# Patient Record
Sex: MALE | Race: WHITE | NOT HISPANIC OR LATINO | Employment: OTHER | URBAN - METROPOLITAN AREA
[De-identification: names, ages, dates, MRNs, and addresses within clinical notes are randomized per-mention and may not be internally consistent; named-entity substitution may affect disease eponyms.]

---

## 2019-10-31 ENCOUNTER — HOSPITAL ENCOUNTER (OUTPATIENT)
Facility: HOSPITAL | Age: 49
Setting detail: OBSERVATION
Discharge: LEFT BEFORE DISCHARGE | End: 2019-11-01
Attending: EMERGENCY MEDICINE | Admitting: INTERNAL MEDICINE

## 2019-10-31 ENCOUNTER — APPOINTMENT (OUTPATIENT)
Dept: CT IMAGING | Facility: HOSPITAL | Age: 49
End: 2019-10-31

## 2019-10-31 ENCOUNTER — APPOINTMENT (OUTPATIENT)
Dept: GENERAL RADIOLOGY | Facility: HOSPITAL | Age: 49
End: 2019-10-31

## 2019-10-31 DIAGNOSIS — Z45.02 AICD DISCHARGE: Primary | ICD-10-CM

## 2019-10-31 DIAGNOSIS — R42 LIGHTHEADED: ICD-10-CM

## 2019-10-31 DIAGNOSIS — R06.00 DYSPNEA, UNSPECIFIED TYPE: ICD-10-CM

## 2019-10-31 PROBLEM — I42.8 NICM (NONISCHEMIC CARDIOMYOPATHY) (HCC): Chronic | Status: ACTIVE | Noted: 2019-10-31

## 2019-10-31 LAB
ALBUMIN SERPL-MCNC: 4.3 G/DL (ref 3.5–5.2)
ALBUMIN/GLOB SERPL: 1.5 G/DL
ALP SERPL-CCNC: 74 U/L (ref 39–117)
ALT SERPL W P-5'-P-CCNC: 24 U/L (ref 1–41)
ANION GAP SERPL CALCULATED.3IONS-SCNC: 11 MMOL/L (ref 5–15)
AST SERPL-CCNC: 20 U/L (ref 1–40)
BASOPHILS # BLD AUTO: 0.2 10*3/MM3 (ref 0–0.2)
BASOPHILS NFR BLD AUTO: 1.2 % (ref 0–1.5)
BILIRUB SERPL-MCNC: 0.2 MG/DL (ref 0.2–1.2)
BUN BLD-MCNC: 4 MG/DL (ref 6–20)
BUN/CREAT SERPL: 4.8 (ref 7–25)
CALCIUM SPEC-SCNC: 9.1 MG/DL (ref 8.6–10.5)
CHLORIDE SERPL-SCNC: 98 MMOL/L (ref 98–107)
CK SERPL-CCNC: 196 U/L (ref 20–200)
CO2 SERPL-SCNC: 28 MMOL/L (ref 22–29)
CREAT BLD-MCNC: 0.83 MG/DL (ref 0.76–1.27)
DEPRECATED RDW RBC AUTO: 47.7 FL (ref 37–54)
EOSINOPHIL # BLD AUTO: 0.2 10*3/MM3 (ref 0–0.4)
EOSINOPHIL NFR BLD AUTO: 1.8 % (ref 0.3–6.2)
ERYTHROCYTE [DISTWIDTH] IN BLOOD BY AUTOMATED COUNT: 14.1 % (ref 12.3–15.4)
GFR SERPL CREATININE-BSD FRML MDRD: 98 ML/MIN/1.73
GLOBULIN UR ELPH-MCNC: 2.9 GM/DL
GLUCOSE BLD-MCNC: 128 MG/DL (ref 65–99)
HCT VFR BLD AUTO: 51.3 % (ref 37.5–51)
HGB BLD-MCNC: 17.4 G/DL (ref 13–17.7)
LYMPHOCYTES # BLD AUTO: 4.1 10*3/MM3 (ref 0.7–3.1)
LYMPHOCYTES NFR BLD AUTO: 30.4 % (ref 19.6–45.3)
MAGNESIUM SERPL-MCNC: 1.9 MG/DL (ref 1.6–2.6)
MCH RBC QN AUTO: 32.6 PG (ref 26.6–33)
MCHC RBC AUTO-ENTMCNC: 33.8 G/DL (ref 31.5–35.7)
MCV RBC AUTO: 96.5 FL (ref 79–97)
MONOCYTES # BLD AUTO: 1.3 10*3/MM3 (ref 0.1–0.9)
MONOCYTES NFR BLD AUTO: 9.6 % (ref 5–12)
NEUTROPHILS # BLD AUTO: 7.8 10*3/MM3 (ref 1.7–7)
NEUTROPHILS NFR BLD AUTO: 57 % (ref 42.7–76)
NRBC BLD AUTO-RTO: 0 /100 WBC (ref 0–0.2)
PLATELET # BLD AUTO: 270 10*3/MM3 (ref 140–450)
PMV BLD AUTO: 8.8 FL (ref 6–12)
POTASSIUM BLD-SCNC: 4.1 MMOL/L (ref 3.5–5.2)
PROT SERPL-MCNC: 7.2 G/DL (ref 6–8.5)
RBC # BLD AUTO: 5.32 10*6/MM3 (ref 4.14–5.8)
SODIUM BLD-SCNC: 137 MMOL/L (ref 136–145)
TROPONIN T SERPL-MCNC: <0.01 NG/ML (ref 0–0.03)
TSH SERPL DL<=0.05 MIU/L-ACNC: 1.59 UIU/ML (ref 0.27–4.2)
WBC NRBC COR # BLD: 13.6 10*3/MM3 (ref 3.4–10.8)

## 2019-10-31 PROCEDURE — 93005 ELECTROCARDIOGRAM TRACING: CPT | Performed by: EMERGENCY MEDICINE

## 2019-10-31 PROCEDURE — 70450 CT HEAD/BRAIN W/O DYE: CPT

## 2019-10-31 PROCEDURE — G0378 HOSPITAL OBSERVATION PER HR: HCPCS

## 2019-10-31 PROCEDURE — 84443 ASSAY THYROID STIM HORMONE: CPT | Performed by: EMERGENCY MEDICINE

## 2019-10-31 PROCEDURE — 83735 ASSAY OF MAGNESIUM: CPT | Performed by: EMERGENCY MEDICINE

## 2019-10-31 PROCEDURE — 71045 X-RAY EXAM CHEST 1 VIEW: CPT

## 2019-10-31 PROCEDURE — 80053 COMPREHEN METABOLIC PANEL: CPT | Performed by: EMERGENCY MEDICINE

## 2019-10-31 PROCEDURE — 82550 ASSAY OF CK (CPK): CPT | Performed by: EMERGENCY MEDICINE

## 2019-10-31 PROCEDURE — 85025 COMPLETE CBC W/AUTO DIFF WBC: CPT | Performed by: EMERGENCY MEDICINE

## 2019-10-31 PROCEDURE — 99284 EMERGENCY DEPT VISIT MOD MDM: CPT

## 2019-10-31 PROCEDURE — 84484 ASSAY OF TROPONIN QUANT: CPT | Performed by: EMERGENCY MEDICINE

## 2019-10-31 PROCEDURE — 73130 X-RAY EXAM OF HAND: CPT

## 2019-10-31 PROCEDURE — 99220 PR INITIAL OBSERVATION CARE/DAY 70 MINUTES: CPT | Performed by: INTERNAL MEDICINE

## 2019-10-31 RX ORDER — ACETAMINOPHEN 160 MG/5ML
325 SOLUTION ORAL EVERY 4 HOURS PRN
Status: DISCONTINUED | OUTPATIENT
Start: 2019-10-31 | End: 2019-11-01 | Stop reason: HOSPADM

## 2019-10-31 RX ORDER — SODIUM CHLORIDE 0.9 % (FLUSH) 0.9 %
10 SYRINGE (ML) INJECTION AS NEEDED
Status: DISCONTINUED | OUTPATIENT
Start: 2019-10-31 | End: 2019-11-01 | Stop reason: HOSPADM

## 2019-10-31 RX ORDER — BISACODYL 10 MG
10 SUPPOSITORY, RECTAL RECTAL DAILY PRN
Status: DISCONTINUED | OUTPATIENT
Start: 2019-10-31 | End: 2019-11-01 | Stop reason: HOSPADM

## 2019-10-31 RX ORDER — SENNA PLUS 8.6 MG/1
1 TABLET ORAL NIGHTLY
Status: DISCONTINUED | OUTPATIENT
Start: 2019-10-31 | End: 2019-11-01 | Stop reason: HOSPADM

## 2019-10-31 RX ORDER — SODIUM CHLORIDE 0.9 % (FLUSH) 0.9 %
10 SYRINGE (ML) INJECTION EVERY 12 HOURS SCHEDULED
Status: DISCONTINUED | OUTPATIENT
Start: 2019-10-31 | End: 2019-11-01 | Stop reason: HOSPADM

## 2019-10-31 RX ORDER — ACETAMINOPHEN 325 MG/1
325 TABLET ORAL EVERY 4 HOURS PRN
Status: DISCONTINUED | OUTPATIENT
Start: 2019-10-31 | End: 2019-11-01 | Stop reason: HOSPADM

## 2019-10-31 RX ORDER — METOPROLOL SUCCINATE 50 MG/1
50 TABLET, EXTENDED RELEASE ORAL DAILY
COMMUNITY

## 2019-10-31 RX ORDER — METOPROLOL SUCCINATE 50 MG/1
50 TABLET, EXTENDED RELEASE ORAL DAILY
Status: DISCONTINUED | OUTPATIENT
Start: 2019-11-01 | End: 2019-11-01 | Stop reason: HOSPADM

## 2019-10-31 RX ORDER — NICOTINE 21 MG/24HR
1 PATCH, TRANSDERMAL 24 HOURS TRANSDERMAL EVERY 24 HOURS
Status: DISCONTINUED | OUTPATIENT
Start: 2019-10-31 | End: 2019-11-01 | Stop reason: HOSPADM

## 2019-10-31 RX ORDER — ACETAMINOPHEN 650 MG/1
325 SUPPOSITORY RECTAL EVERY 4 HOURS PRN
Status: DISCONTINUED | OUTPATIENT
Start: 2019-10-31 | End: 2019-11-01 | Stop reason: HOSPADM

## 2019-10-31 RX ORDER — HEPARIN SODIUM 5000 [USP'U]/ML
5000 INJECTION, SOLUTION INTRAVENOUS; SUBCUTANEOUS EVERY 12 HOURS SCHEDULED
Status: DISCONTINUED | OUTPATIENT
Start: 2019-10-31 | End: 2019-11-01 | Stop reason: HOSPADM

## 2019-10-31 RX ORDER — HYDROCODONE BITARTRATE AND ACETAMINOPHEN 7.5; 325 MG/1; MG/1
1 TABLET ORAL EVERY 4 HOURS PRN
Status: DISCONTINUED | OUTPATIENT
Start: 2019-10-31 | End: 2019-11-01 | Stop reason: HOSPADM

## 2019-10-31 RX ADMIN — HYDROCODONE BITARTRATE AND ACETAMINOPHEN 1 TABLET: 7.5; 325 TABLET ORAL at 23:57

## 2019-10-31 RX ADMIN — Medication 10 ML: at 16:11

## 2019-10-31 RX ADMIN — Medication 10 ML: at 20:52

## 2019-10-31 RX ADMIN — NICOTINE 1 PATCH: 21 PATCH TRANSDERMAL at 19:38

## 2019-11-01 ENCOUNTER — HOSPITAL ENCOUNTER (OUTPATIENT)
Dept: CARDIOLOGY | Facility: HOSPITAL | Age: 49
Setting detail: OBSERVATION
Discharge: HOME | End: 2019-11-01

## 2019-11-01 VITALS
RESPIRATION RATE: 18 BRPM | OXYGEN SATURATION: 95 % | SYSTOLIC BLOOD PRESSURE: 128 MMHG | WEIGHT: 234.79 LBS | DIASTOLIC BLOOD PRESSURE: 73 MMHG | HEIGHT: 75 IN | BODY MASS INDEX: 29.19 KG/M2 | HEART RATE: 76 BPM | TEMPERATURE: 98 F

## 2019-11-01 VITALS
WEIGHT: 234 LBS | DIASTOLIC BLOOD PRESSURE: 73 MMHG | SYSTOLIC BLOOD PRESSURE: 128 MMHG | HEIGHT: 75 IN | BODY MASS INDEX: 29.09 KG/M2

## 2019-11-01 LAB
ANION GAP SERPL CALCULATED.3IONS-SCNC: 8 MMOL/L (ref 5–15)
BASOPHILS # BLD AUTO: 0 10*3/MM3 (ref 0–0.2)
BASOPHILS NFR BLD AUTO: 0.4 % (ref 0–1.5)
BH CV ECHO MEAS - % IVS THICK: 4.1 %
BH CV ECHO MEAS - % LVPW THICK: 24 %
BH CV ECHO MEAS - ACS: 2.3 CM
BH CV ECHO MEAS - AO MAX PG (FULL): 0.36 MMHG
BH CV ECHO MEAS - AO MAX PG: 3.9 MMHG
BH CV ECHO MEAS - AO MEAN PG (FULL): 0.51 MMHG
BH CV ECHO MEAS - AO MEAN PG: 2.2 MMHG
BH CV ECHO MEAS - AO ROOT AREA (BSA CORRECTED): 1.6
BH CV ECHO MEAS - AO ROOT AREA: 11.6 CM^2
BH CV ECHO MEAS - AO ROOT DIAM: 3.8 CM
BH CV ECHO MEAS - AO V2 MAX: 99.3 CM/SEC
BH CV ECHO MEAS - AO V2 MEAN: 71.7 CM/SEC
BH CV ECHO MEAS - AO V2 VTI: 17.9 CM
BH CV ECHO MEAS - AVA(I,A): 4.4 CM^2
BH CV ECHO MEAS - AVA(I,D): 4.4 CM^2
BH CV ECHO MEAS - AVA(V,A): 4.4 CM^2
BH CV ECHO MEAS - AVA(V,D): 4.4 CM^2
BH CV ECHO MEAS - BSA(HAYCOCK): 2.4 M^2
BH CV ECHO MEAS - BSA: 2.3 M^2
BH CV ECHO MEAS - BZI_BMI: 29.2 KILOGRAMS/M^2
BH CV ECHO MEAS - BZI_METRIC_HEIGHT: 190.5 CM
BH CV ECHO MEAS - BZI_METRIC_WEIGHT: 106.1 KG
BH CV ECHO MEAS - EDV(CUBED): 202.8 ML
BH CV ECHO MEAS - EDV(MOD-SP2): 172 ML
BH CV ECHO MEAS - EDV(MOD-SP4): 176.1 ML
BH CV ECHO MEAS - EDV(TEICH): 171.5 ML
BH CV ECHO MEAS - EF(CUBED): 28.2 %
BH CV ECHO MEAS - EF(MOD-BP): 29 %
BH CV ECHO MEAS - EF(MOD-SP2): 37 %
BH CV ECHO MEAS - EF(MOD-SP4): 21.8 %
BH CV ECHO MEAS - EF(TEICH): 22.5 %
BH CV ECHO MEAS - ESV(CUBED): 145.6 ML
BH CV ECHO MEAS - ESV(MOD-SP2): 108.3 ML
BH CV ECHO MEAS - ESV(MOD-SP4): 137.6 ML
BH CV ECHO MEAS - ESV(TEICH): 133 ML
BH CV ECHO MEAS - FS: 10.5 %
BH CV ECHO MEAS - IVS/LVPW: 1
BH CV ECHO MEAS - IVSD: 1.3 CM
BH CV ECHO MEAS - IVSS: 1.4 CM
BH CV ECHO MEAS - LA DIMENSION(2D): 3.2 CM
BH CV ECHO MEAS - LV DIASTOLIC VOL/BSA (35-75): 75.1 ML/M^2
BH CV ECHO MEAS - LV MASS(C)D: 340 GRAMS
BH CV ECHO MEAS - LV MASS(C)DI: 144.9 GRAMS/M^2
BH CV ECHO MEAS - LV MASS(C)S: 344 GRAMS
BH CV ECHO MEAS - LV MASS(C)SI: 146.7 GRAMS/M^2
BH CV ECHO MEAS - LV MAX PG: 3.6 MMHG
BH CV ECHO MEAS - LV MEAN PG: 1.7 MMHG
BH CV ECHO MEAS - LV SYSTOLIC VOL/BSA (12-30): 58.7 ML/M^2
BH CV ECHO MEAS - LV V1 MAX: 94.6 CM/SEC
BH CV ECHO MEAS - LV V1 MEAN: 61.2 CM/SEC
BH CV ECHO MEAS - LV V1 VTI: 17.2 CM
BH CV ECHO MEAS - LVIDD: 5.9 CM
BH CV ECHO MEAS - LVIDS: 5.3 CM
BH CV ECHO MEAS - LVOT AREA: 4.6 CM^2
BH CV ECHO MEAS - LVOT DIAM: 2.4 CM
BH CV ECHO MEAS - LVPWD: 1.3 CM
BH CV ECHO MEAS - LVPWS: 1.6 CM
BH CV ECHO MEAS - MR MAX PG: 44.1 MMHG
BH CV ECHO MEAS - MR MAX VEL: 332 CM/SEC
BH CV ECHO MEAS - MV A MAX VEL: 56.5 CM/SEC
BH CV ECHO MEAS - MV DEC SLOPE: 264.6 CM/SEC^2
BH CV ECHO MEAS - MV DEC TIME: 0.15 SEC
BH CV ECHO MEAS - MV E MAX VEL: 39.5 CM/SEC
BH CV ECHO MEAS - MV E/A: 0.7
BH CV ECHO MEAS - MV MAX PG: 1.7 MMHG
BH CV ECHO MEAS - MV MEAN PG: 0.73 MMHG
BH CV ECHO MEAS - MV V2 MAX: 64.9 CM/SEC
BH CV ECHO MEAS - MV V2 MEAN: 40.6 CM/SEC
BH CV ECHO MEAS - MV V2 VTI: 11.5 CM
BH CV ECHO MEAS - MVA(VTI): 6.9 CM^2
BH CV ECHO MEAS - PA ACC TIME: 0.13 SEC
BH CV ECHO MEAS - PA MAX PG (FULL): 1.1 MMHG
BH CV ECHO MEAS - PA MAX PG: 2.1 MMHG
BH CV ECHO MEAS - PA MEAN PG (FULL): 0.47 MMHG
BH CV ECHO MEAS - PA MEAN PG: 1 MMHG
BH CV ECHO MEAS - PA PR(ACCEL): 18.3 MMHG
BH CV ECHO MEAS - PA V2 MAX: 72.3 CM/SEC
BH CV ECHO MEAS - PA V2 MEAN: 47.4 CM/SEC
BH CV ECHO MEAS - PA V2 VTI: 14.8 CM
BH CV ECHO MEAS - PULM A REVS DUR: 0.08 SEC
BH CV ECHO MEAS - PULM A REVS VEL: 27.9 CM/SEC
BH CV ECHO MEAS - PULM DIAS VEL: 33.2 CM/SEC
BH CV ECHO MEAS - PULM S/D: 1.3
BH CV ECHO MEAS - PULM SYS VEL: 43.5 CM/SEC
BH CV ECHO MEAS - RV MAX PG: 0.96 MMHG
BH CV ECHO MEAS - RV MEAN PG: 0.54 MMHG
BH CV ECHO MEAS - RV V1 MAX: 49 CM/SEC
BH CV ECHO MEAS - RV V1 MEAN: 34.4 CM/SEC
BH CV ECHO MEAS - RV V1 VTI: 10 CM
BH CV ECHO MEAS - RVDD: 2 CM
BH CV ECHO MEAS - SI(AO): 88.2 ML/M^2
BH CV ECHO MEAS - SI(CUBED): 24.4 ML/M^2
BH CV ECHO MEAS - SI(LVOT): 33.7 ML/M^2
BH CV ECHO MEAS - SI(MOD-SP2): 27.2 ML/M^2
BH CV ECHO MEAS - SI(MOD-SP4): 16.4 ML/M^2
BH CV ECHO MEAS - SI(TEICH): 16.4 ML/M^2
BH CV ECHO MEAS - SV(AO): 206.9 ML
BH CV ECHO MEAS - SV(CUBED): 57.2 ML
BH CV ECHO MEAS - SV(LVOT): 78.9 ML
BH CV ECHO MEAS - SV(MOD-SP2): 63.7 ML
BH CV ECHO MEAS - SV(MOD-SP4): 38.4 ML
BH CV ECHO MEAS - SV(TEICH): 38.5 ML
BH CV ECHO MEAS - TR MAX VEL: 273.8 CM/SEC
BUN BLD-MCNC: 7 MG/DL (ref 6–20)
BUN/CREAT SERPL: 7.7 (ref 7–25)
CALCIUM SPEC-SCNC: 8.7 MG/DL (ref 8.6–10.5)
CHLORIDE SERPL-SCNC: 101 MMOL/L (ref 98–107)
CHOLEST SERPL-MCNC: 142 MG/DL (ref 0–200)
CO2 SERPL-SCNC: 30 MMOL/L (ref 22–29)
CREAT BLD-MCNC: 0.91 MG/DL (ref 0.76–1.27)
DEPRECATED RDW RBC AUTO: 46.8 FL (ref 37–54)
EOSINOPHIL # BLD AUTO: 0.2 10*3/MM3 (ref 0–0.4)
EOSINOPHIL NFR BLD AUTO: 1.7 % (ref 0.3–6.2)
ERYTHROCYTE [DISTWIDTH] IN BLOOD BY AUTOMATED COUNT: 13.8 % (ref 12.3–15.4)
GFR SERPL CREATININE-BSD FRML MDRD: 89 ML/MIN/1.73
GLUCOSE BLD-MCNC: 161 MG/DL (ref 65–99)
HCT VFR BLD AUTO: 48.8 % (ref 37.5–51)
HDLC SERPL-MCNC: 29 MG/DL (ref 40–60)
HGB BLD-MCNC: 16.6 G/DL (ref 13–17.7)
LDLC SERPL CALC-MCNC: 40 MG/DL (ref 0–100)
LDLC/HDLC SERPL: 1.39 {RATIO}
LV EF 2D ECHO EST: 25 %
LYMPHOCYTES # BLD AUTO: 2.9 10*3/MM3 (ref 0.7–3.1)
LYMPHOCYTES NFR BLD AUTO: 25.8 % (ref 19.6–45.3)
MAGNESIUM SERPL-MCNC: 1.9 MG/DL (ref 1.6–2.6)
MCH RBC QN AUTO: 32.7 PG (ref 26.6–33)
MCHC RBC AUTO-ENTMCNC: 33.9 G/DL (ref 31.5–35.7)
MCV RBC AUTO: 96.4 FL (ref 79–97)
MONOCYTES # BLD AUTO: 1.2 10*3/MM3 (ref 0.1–0.9)
MONOCYTES NFR BLD AUTO: 10.7 % (ref 5–12)
NEUTROPHILS # BLD AUTO: 7 10*3/MM3 (ref 1.7–7)
NEUTROPHILS NFR BLD AUTO: 61.4 % (ref 42.7–76)
NRBC BLD AUTO-RTO: 0.2 /100 WBC (ref 0–0.2)
PLATELET # BLD AUTO: 238 10*3/MM3 (ref 140–450)
PMV BLD AUTO: 9.2 FL (ref 6–12)
POTASSIUM BLD-SCNC: 3.9 MMOL/L (ref 3.5–5.2)
RBC # BLD AUTO: 5.07 10*6/MM3 (ref 4.14–5.8)
SODIUM BLD-SCNC: 139 MMOL/L (ref 136–145)
TRIGL SERPL-MCNC: 364 MG/DL (ref 0–150)
TROPONIN T SERPL-MCNC: <0.01 NG/ML (ref 0–0.03)
VLDLC SERPL-MCNC: 72.8 MG/DL
WBC NRBC COR # BLD: 11.4 10*3/MM3 (ref 3.4–10.8)

## 2019-11-01 PROCEDURE — 93306 TTE W/DOPPLER COMPLETE: CPT | Performed by: INTERNAL MEDICINE

## 2019-11-01 PROCEDURE — 85025 COMPLETE CBC W/AUTO DIFF WBC: CPT | Performed by: INTERNAL MEDICINE

## 2019-11-01 PROCEDURE — 83735 ASSAY OF MAGNESIUM: CPT | Performed by: NURSE PRACTITIONER

## 2019-11-01 PROCEDURE — 80048 BASIC METABOLIC PNL TOTAL CA: CPT | Performed by: INTERNAL MEDICINE

## 2019-11-01 PROCEDURE — 80061 LIPID PANEL: CPT | Performed by: INTERNAL MEDICINE

## 2019-11-01 PROCEDURE — 93010 ELECTROCARDIOGRAM REPORT: CPT | Performed by: INTERNAL MEDICINE

## 2019-11-01 PROCEDURE — 99204 OFFICE O/P NEW MOD 45 MIN: CPT | Performed by: NURSE PRACTITIONER

## 2019-11-01 PROCEDURE — G0378 HOSPITAL OBSERVATION PER HR: HCPCS

## 2019-11-01 PROCEDURE — 84484 ASSAY OF TROPONIN QUANT: CPT | Performed by: INTERNAL MEDICINE

## 2019-11-01 PROCEDURE — 93005 ELECTROCARDIOGRAM TRACING: CPT | Performed by: INTERNAL MEDICINE

## 2019-11-01 PROCEDURE — 93306 TTE W/DOPPLER COMPLETE: CPT

## 2019-11-01 RX ADMIN — METOPROLOL SUCCINATE 50 MG: 50 TABLET, EXTENDED RELEASE ORAL at 09:58

## 2019-11-01 RX ADMIN — Medication 10 ML: at 09:59

## 2019-11-01 NOTE — CONSULTS
"CARDIOLOGY CONSULT NOTE      Referring Provider: Basilia    Reason for Consultation: ICD fire    Attending: Paul Cui MD    Chief complaint    Shortness of breath    Subjective .     History of present illness:  Frankie Bourgeois is a 49 y.o. male who presented to the emergency department with complaints of recent ICD fire.  The patient states that on Monday his defibrillator began firing \"for no good reason\", he states that he has absolutely no symptomology prior to firing and that he was sitting on the couch with his spouse watching a movie.  The patient states that he had previous episodes last year and the year before, he has been [facility] and he states that they will not.  The patient states that he has been having chest pain since his heart cath in 2008, he describes a \"sharp\" sensation to his left axillary and left chest that does not radiate, is associated with shortness of breath, but denies other symptomology such as syncope/near syncope, diaphoresis, edema, or nausea/vomiting.  He states that the chest pain has not changed in nature in the last 10 years, his last heart cath he states was in 2008 at the [facility].  He states that he had undergone a stress test at that time where he \"collapsed\" on the treadmill and then he was sent for the cardiac cath.  He states that he woke up with the defibrillator in place, he does not remember giving permission to do so, and is quite convinced that it needs to be taken out.  Other pertinent history includes diagnosis of ASD at age 16, the patient states that he does not know if they have had any further work-up for closure, and CVA in 2016.  Of note, history was relatively difficult to obtain with continued focus on discussion regarding removal of device, patient at times is agitated but no aggression is noted.    Primary history: CVA 2016, nonischemic [id number] with ICD in place, PTSD, ASD defect, history of GI bleeding with " "anticoagulation    Surgical history: Appendectomy 1997- complicated course    Social history: Smokes 1/2 packs/day with a 33-pack-year history, denies EtOH, denies illicits, denies herbal use.  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  Lives with spouse of 17 years.    Review of Systems   Constitution: Negative for diaphoresis, malaise/fatigue, weight gain and weight loss.   Cardiovascular: Positive for chest pain. Negative for dyspnea on exertion, leg swelling, near-syncope, orthopnea, palpitations and syncope.   Respiratory: Positive for shortness of breath. Negative for hemoptysis, sputum production and wheezing.    Skin: Negative for rash.   Gastrointestinal: Negative for abdominal pain, hematemesis, hematochezia, nausea and vomiting.   Neurological: Negative for light-headedness, numbness and seizures. Dizziness: During ICD fire, but not prior to.   Psychiatric/Behavioral: Negative.    All other systems reviewed and are negative.      History  Past Medical History:   Diagnosis Date   • Atrial septal defect    • Nonischemic cardiomyopathy (CMS/HCC)    • Stroke (CMS/HCC)        History reviewed. No pertinent surgical history.    Family History   Problem Relation Age of Onset   • Heart disease Mother    • Heart disease Father        Social History     Tobacco Use   • Smoking status: Current Every Day Smoker     Packs/day: 1.50   Substance Use Topics   • Alcohol use: No     Frequency: Never   • Drug use: Not on file        Medications Prior to Admission   Medication Sig Dispense Refill Last Dose   • metoprolol succinate XL (TOPROL-XL) 50 MG 24 hr tablet Take 50 mg by mouth Daily.   10/31/2019 at Unknown time         Penicillins    Scheduled Meds:  heparin (porcine) 5,000 Units Subcutaneous Q12H   metoprolol succinate XL 50 mg Oral Daily   nicotine 1 patch Transdermal Q24H   senna 1 tablet Oral Nightly   sodium chloride 10 mL Intravenous Q12H     Continuous " "Infusions:   PRN Meds:.•  acetaminophen **OR** acetaminophen **OR** acetaminophen  •  bisacodyl  •  HYDROcodone-acetaminophen  •  [COMPLETED] Insert peripheral IV **AND** sodium chloride  •  sodium chloride    Objective     VITAL SIGNS  Vitals:    10/31/19 1602 10/31/19 1754 10/31/19 2300 11/01/19 0615   BP: 134/91 120/77 118/71 128/73   BP Location:  Right arm Right arm Right arm   Patient Position:  Lying Lying Lying   Pulse: 84 73  76   Resp:  20 18 18   Temp:  97.6 °F (36.4 °C) 98 °F (36.7 °C) 98 °F (36.7 °C)   TempSrc:  Oral Oral Oral   SpO2: 96% 96% 95% 95%   Weight:  107 kg (234 lb 12.6 oz)     Height:  190.5 cm (75\")         Flowsheet Rows      First Filed Value   Admission Height  190.5 cm (75\") Documented at 10/31/2019 1535   Admission Weight  110 kg (242 lb 4.6 oz) Documented at 10/31/2019 1535               Physical Exam:  Physical Exam   Constitutional: He is oriented to person, place, and time. He appears well-developed and well-nourished. He is cooperative.   Neck: Normal carotid pulses and no JVD present. Carotid bruit is not present.   Cardiovascular: Normal rate, regular rhythm, normal heart sounds and intact distal pulses. Exam reveals no gallop and no friction rub.   No murmur heard.  Pulses:       Carotid pulses are 2+ on the right side, and 2+ on the left side.       Radial pulses are 2+ on the right side, and 2+ on the left side.        Posterior tibial pulses are 2+ on the right side, and 2+ on the left side.   Pulmonary/Chest: Effort normal and breath sounds normal. He has no decreased breath sounds. He has no wheezes. He has no rhonchi. He has no rales.   Abdominal: Soft. Bowel sounds are normal. He exhibits no distension and no mass. There is no hepatosplenomegaly. There is no tenderness. There is no guarding.   Musculoskeletal: He exhibits no edema.   Neurological: He is alert and oriented to person, place, and time.   Skin: Skin is warm and dry. Rash (Multiple healed circular sores and " scars on extremities and back) noted. No erythema. No pallor.   Psychiatric: His speech is normal.   Occasional rambling and mumbling speech, patient is able to coherently describe his history with repeated prompting        Results Review:        WBC WBC   Date Value Ref Range Status   11/01/2019 11.40 (H) 3.40 - 10.80 10*3/mm3 Final   10/31/2019 13.60 (H) 3.40 - 10.80 10*3/mm3 Final      HGB Hemoglobin   Date Value Ref Range Status   11/01/2019 16.6 13.0 - 17.7 g/dL Final   10/31/2019 17.4 13.0 - 17.7 g/dL Final      HCT Hematocrit   Date Value Ref Range Status   11/01/2019 48.8 37.5 - 51.0 % Final   10/31/2019 51.3 (H) 37.5 - 51.0 % Final      Platelets Platelets   Date Value Ref Range Status   11/01/2019 238 140 - 450 10*3/mm3 Final   10/31/2019 270 140 - 450 10*3/mm3 Final        PT/INR:  No results found for: PROTIME/No results found for: INR    Sodium Sodium   Date Value Ref Range Status   11/01/2019 139 136 - 145 mmol/L Final   10/31/2019 137 136 - 145 mmol/L Final      Potassium Potassium   Date Value Ref Range Status   11/01/2019 3.9 3.5 - 5.2 mmol/L Final   10/31/2019 4.1 3.5 - 5.2 mmol/L Final      Chloride Chloride   Date Value Ref Range Status   11/01/2019 101 98 - 107 mmol/L Final   10/31/2019 98 98 - 107 mmol/L Final      Bicarbonate CO2   Date Value Ref Range Status   11/01/2019 30.0 (H) 22.0 - 29.0 mmol/L Final   10/31/2019 28.0 22.0 - 29.0 mmol/L Final      BUN BUN   Date Value Ref Range Status   11/01/2019 7 6 - 20 mg/dL Final   10/31/2019 4 (L) 6 - 20 mg/dL Final      Creatinine Creatinine   Date Value Ref Range Status   11/01/2019 0.91 0.76 - 1.27 mg/dL Final   10/31/2019 0.83 0.76 - 1.27 mg/dL Final      Calcium Calcium   Date Value Ref Range Status   11/01/2019 8.7 8.6 - 10.5 mg/dL Final   10/31/2019 9.1 8.6 - 10.5 mg/dL Final      Magnesium Magnesium   Date Value Ref Range Status   10/31/2019 1.9 1.6 - 2.6 mg/dL Final        Troponin No results found for: TROPONIN   BNP No results found for:  BNP       EKG    I personally viewed and interpreted the patient's EKG/Telemetry data:  NSR, LBBB, LVH, left axis deviation, lateral T wave inversion    ECHOCARDIOGRAM:      STRESS MYOVIEW:    CARDIAC CATHETERIZATION:    OTHER:         Assessment/Plan       AICD discharge    NICM (nonischemic cardiomyopathy) (CMS/HCC)    Atrial septal defect    -ICD fire  -NICM  -CVA----no residual deficits, CTA head demonstrates old cerebellar infarct  -Possible ASD defect  -History of GI bleeding with anticoagulation    Echo pending, add magnesium to lab studies, TSH normal, troponins negative.  Interrogate defibrillator today.  [facility].  Further recommendations per Dr. Harman.    Roxana Sanchez, KAYLI  11/01/19  8:48 AM  Roxana Sanchez, EP cardiology, [phone] (cell)    **All problems new to this practitioner  **EKG independently reviewed and interpreted

## 2019-11-01 NOTE — PROGRESS NOTES
Discharge Planning Assessment   Giovanny     Patient Name: Frankie Bourgeois  MRN: [id number]  Today's Date: 11/1/2019    Admit Date: 10/31/2019    Discharge Needs Assessment     Row Name 11/01/19 1303       Living Environment    Lives With  spouse    Current Living Arrangements  home/apartment/condo    Primary Care Provided by  self    Able to Return to Prior Arrangements  yes       Resource/Environmental Concerns    Resource/Environmental Concerns  none    Transportation Concerns  car, none       Transition Planning    Patient/Family Anticipates Transition to  home with family    Patient/Family Anticipated Services at Transition  none    Transportation Anticipated  car, drives self;family or friend will provide       Discharge Needs Assessment    Readmission Within the Last 30 Days  no previous admission in last 30 days    Concerns to be Addressed  no discharge needs identified    Equipment Currently Used at Home  none    Anticipated Changes Related to Illness  none    Equipment Needed After Discharge  none        Discharge Plan     Row Name 11/01/19 1304       Plan    Plan  Routine d/c to home         Expected Discharge Date and Time     Expected Discharge Date Expected Discharge Time    Nov 1, 2019         Demographic Summary     Row Name 11/01/19 1303       General Information    Admission Type  observation    Arrived From  emergency department    Referral Source  admission list    Reason for Consult  discharge planning    Preferred Language  English        Functional Status     Row Name 11/01/19 1303       Functional Status, IADL    Medications  independent    Meal Preparation  independent    Housekeeping  independent    Laundry  independent    Shopping  independent        DC today     Emely Cadet RN, CM  Office Phone [phone]  Cell [phone]

## 2019-11-01 NOTE — PLAN OF CARE
Problem: Patient Care Overview  Goal: Plan of Care Review  Outcome: Ongoing (interventions implemented as appropriate)   11/01/19 0108   Coping/Psychosocial   Plan of Care Reviewed With patient;spouse   Plan of Care Review   Progress no change   OTHER   Outcome Summary pt still having chest pain. Will be seen by EP today for evaluation.      Goal: Individualization and Mutuality  Outcome: Ongoing (interventions implemented as appropriate)    Goal: Discharge Needs Assessment  Outcome: Ongoing (interventions implemented as appropriate)    Goal: Interprofessional Rounds/Family Conf  Outcome: Ongoing (interventions implemented as appropriate)      Problem: Fall Risk (Adult)  Goal: Identify Related Risk Factors and Signs and Symptoms  Outcome: Outcome(s) achieved Date Met: 11/01/19    Goal: Absence of Fall  Outcome: Ongoing (interventions implemented as appropriate)      Problem: Cardiac: ACS (Acute Coronary Syndrome) (Adult)  Goal: Signs and Symptoms of Listed Potential Problems Will be Absent, Minimized or Managed (Cardiac: ACS)  Outcome: Ongoing (interventions implemented as appropriate)

## 2019-11-01 NOTE — NURSING NOTE
Pt states he feels like no one is going to do anything here to help him and he doesn't want to stay and see what Dr Harman has to say about his ICD. Risks explained to pt and he opted to leave AMA anyway. IV removed and physicians notified.

## 2019-11-01 NOTE — DISCHARGE SUMMARY
Date of Admission: 10/31/2019    Date of Discharge:  11/1/2019    Length of stay:  LOS: 0 days     Presenting Problem/History of Present Illness  Active Hospital Problems    Diagnosis  POA   • AICD discharge [Z45.02]  Not Applicable   • NICM (nonischemic cardiomyopathy) (CMS/HCC) [I42.8]  Yes   • Atrial septal defect [Q21.1]  Not Applicable      Resolved Hospital Problems   No resolved problems to display.          Hospital Course    No notes on file         Chief Complaint   Patient presents with   • Dizziness     Patient left AMA.  I had no contact with the patient today      Past Medical History:     Past Medical History:   Diagnosis Date   • Atrial septal defect    • Nonischemic cardiomyopathy (CMS/HCC)    • Stroke (CMS/HCC)        Past Surgical History:   History reviewed. No pertinent surgical history.    Social History:   Social History     Socioeconomic History   • Marital status:      Spouse name: Not on file   • Number of children: Not on file   • Years of education: Not on file   • Highest education level: Not on file   Tobacco Use   • Smoking status: Current Every Day Smoker     Packs/day: 1.50   Substance and Sexual Activity   • Alcohol use: No     Frequency: Never   • Sexual activity: Defer   Social History Narrative    Cardiologist [facility]       Vital Signs  Temp:  [97.3 °F (36.3 °C)-98 °F (36.7 °C)] 98 °F (36.7 °C)  Heart Rate:  [73-84] 76  Resp:  [18-20] 18  BP: (118-134)/(71-91) 128/73    Physical Exam:  Physical Exam       Wounds (last 24 hours)      LDA Wound     Row Name               Wound 10/31/19 1945 Bilateral anterior;lower leg Abrasion    Wound - Properties Group Date first assessed: 10/31/19  - Time first assessed: 1945  - Present on Hospital Admission: Y  -AH Side: Bilateral  -AH Orientation: anterior;lower  -AH Location: leg  -AH Primary Wound Type: Abrasion  -AH, multiple scabed areas to bilateral legs         Wound 10/31/19 1945 medial forehead Abrasion    Wound -  Properties Group Date first assessed: 10/31/19  -AH Time first assessed: 1945  -AH Present on Hospital Admission: Y  -AH Orientation: medial  -AH Location: forehead  -AH Primary Wound Type: Abrasion  -AH      User Key  (r) = Recorded By, (t) = Taken By, (c) = Cosigned By    Initials Name Provider Type    Francine Lopez RN Registered Nurse          Discharge Diagnosis:     AICD discharge    NICM (nonischemic cardiomyopathy) (CMS/HCC)    Atrial septal defect      Present on Admission:  • NICM (nonischemic cardiomyopathy) (CMS/HCC)      Estimated Creatinine Clearance: 129.9 mL/min (by C-G formula based on SCr of 0.91 mg/dL).    Discharge Disposition    Destination      No service coordination in this encounter.      Durable Medical Equipment      No service coordination in this encounter.      Dialysis/Infusion      No service coordination in this encounter.      Home Medical Care      No service coordination in this encounter.      Therapy      No service coordination in this encounter.      Community Resources      No service coordination in this encounter.              PT Recommendation and Plan          Left Against Medical Advice           Discharge Medications      ASK your doctor about these medications      Instructions Start Date   metoprolol succinate XL 50 MG 24 hr tablet  Commonly known as:  TOPROL-XL   50 mg, Oral, Daily                 Consults:   Consults     Date and Time Order Name Status Description    10/31/2019 1924 Inpatient Cardiac Electrophysiology Consult Completed     10/31/2019 1701 Hospitalist (on-call MD unless specified) Completed           Procedures Performed:         Pertinent Test Results:     I reviewed the patient's new clinical results.    Results from last 7 days   Lab Units 11/01/19  0432 10/31/19  1613   WBC 10*3/mm3 11.40* 13.60*   HEMOGLOBIN g/dL 16.6 17.4   HEMATOCRIT % 48.8 51.3*   PLATELETS 10*3/mm3 238 270     Results from last 7 days   Lab Units 11/01/19  0432  10/31/19  1613   SODIUM mmol/L 139 137   POTASSIUM mmol/L 3.9 4.1   CHLORIDE mmol/L 101 98   CO2 mmol/L 30.0* 28.0   BUN mg/dL 7 4*   CREATININE mg/dL 0.91 0.83   GLUCOSE mg/dL 161* 128*   CALCIUM mg/dL 8.7 9.1     Results from last 7 days   Lab Units 11/01/19  0432 10/31/19  1613   SODIUM mmol/L 139 137   POTASSIUM mmol/L 3.9 4.1   CHLORIDE mmol/L 101 98   CO2 mmol/L 30.0* 28.0   BUN mg/dL 7 4*   CREATININE mg/dL 0.91 0.83   CALCIUM mg/dL 8.7 9.1   BILIRUBIN mg/dL  --  0.2   ALK PHOS U/L  --  74   ALT (SGPT) U/L  --  24   AST (SGOT) U/L  --  20   GLUCOSE mg/dL 161* 128*     Results from last 7 days   Lab Units 11/01/19  1004 10/31/19  1613   MAGNESIUM mg/dL 1.9 1.9     Lab Results   Component Value Date    CALCIUM 8.7 11/01/2019     No results found for: HGBA1C  Lab Results   Component Value Date    CHOL 142 11/01/2019    TRIG 364 (H) 11/01/2019    HDL 29 (L) 11/01/2019    LDL 40 11/01/2019     No results found for: LIPASE        No results found for: INTRAOP, PREDX, FINALDX, COMDX    Microbiology Results (last 10 days)     ** No results found for the last 240 hours. **          ECG/EMG Results (most recent)     Procedure Component Value Units Date/Time    ECG 12 Lead [[id number]] Collected:  10/31/19 1551     Updated:  11/01/19 0755    Narrative:       HEART RATE= 83  bpm  RR Interval= 724  ms  VA Interval= 209  ms  P Horizontal Axis= -6  deg  P Front Axis= 45  deg  QRSD Interval= 138  ms  QT Interval= 416  ms  QRS Axis= -74  deg  T Wave Axis= 79  deg  - BORDERLINE ECG -  Sinus rhythm  Borderline prolonged VA interval  Probable left atrial enlargement  When compared with ECG of 08-Nov-2013 14:02:05,  No significant change  Electronically Signed By: Efra Wooten (KAYLI) 01-Nov-2019 07:55:25  Date and Time of Study: 2019-10-31 15:51:23    ECG 12 Lead [[id number]] Collected:  11/01/19 1024     Updated:  11/01/19 1026    Narrative:       HEART RATE= 77  bpm  RR Interval= 812  ms  VA Interval= 206  ms  P Horizontal Axis=  -1  deg  P Front Axis= 32  deg  QRSD Interval= 138  ms  QT Interval= 433  ms  QRS Axis= -72  deg  T Wave Axis= 65  deg  - ABNORMAL ECG -  Sinus rhythm  Atrial premature complex  Borderline prolonged MT interval  Probable left atrial enlargement  Left bundle branch block  ST elevation secondary to IVCD  Electronically Signed By:   Date and Time of Study: 2019-11-01 10:24:31    Adult Transthoracic Echo Complete W/ Cont if Necessary Per Protocol [[id number]] Collected:  11/01/19 0842     Updated:  11/01/19 1113     BSA 2.3 m^2      BH CV ECHO DILCIA - RVDD 2.0 cm      IVSd 1.3 cm      IVSs 1.4 cm      LVIDd 5.9 cm      LVIDs 5.3 cm      LVPWd 1.3 cm      BH CV ECHO DILCIA - LVPWS 1.6 cm      IVS/LVPW 1.0     FS 10.5 %      EDV(Teich) 171.5 ml      ESV(Teich) 133.0 ml      EF(Teich) 22.5 %      EDV(cubed) 202.8 ml      ESV(cubed) 145.6 ml      EF(cubed) 28.2 %      % IVS thick 4.1 %      % LVPW thick 24.0 %      LV mass(C)d 340.0 grams      LV mass(C)dI 144.9 grams/m^2      LV mass(C)s 344.0 grams      LV mass(C)sI 146.7 grams/m^2      SV(Teich) 38.5 ml      SI(Teich) 16.4 ml/m^2      SV(cubed) 57.2 ml      SI(cubed) 24.4 ml/m^2      Ao root diam 3.8 cm      Ao root area 11.6 cm^2      ACS 2.3 cm      LVOT diam 2.4 cm      LVOT area 4.6 cm^2      EDV(MOD-sp4) 176.1 ml      ESV(MOD-sp4) 137.6 ml      EF(MOD-sp4) 21.8 %      EDV(MOD-sp2) 172.0 ml      ESV(MOD-sp2) 108.3 ml      EF(MOD-sp2) 37.0 %      SV(MOD-sp4) 38.4 ml      SI(MOD-sp4) 16.4 ml/m^2      SV(MOD-sp2) 63.7 ml      SI(MOD-sp2) 27.2 ml/m^2      Ao root area (BSA corrected) 1.6     LV Forbes Vol (BSA corrected) 75.1 ml/m^2      LV Sys Vol (BSA corrected) 58.7 ml/m^2      MV E max vinod 39.5 cm/sec      MV A max vinod 56.5 cm/sec      MV E/A 0.7     MV V2 max 64.9 cm/sec      MV max PG 1.7 mmHg      MV V2 mean 40.6 cm/sec      MV mean PG 0.73 mmHg      MV V2 VTI 11.5 cm      MVA(VTI) 6.9 cm^2      MV dec slope 264.6 cm/sec^2      MV dec time 0.15 sec      Ao pk vinod 99.3  cm/sec      Ao max PG 3.9 mmHg      Ao max PG (full) 0.36 mmHg      Ao V2 mean 71.7 cm/sec      Ao mean PG 2.2 mmHg      Ao mean PG (full) 0.51 mmHg      Ao V2 VTI 17.9 cm      JOSE(I,A) 4.4 cm^2      JOSE(I,D) 4.4 cm^2      JOSE(V,A) 4.4 cm^2      JOSE(V,D) 4.4 cm^2      LV V1 max PG 3.6 mmHg      LV V1 mean PG 1.7 mmHg      LV V1 max 94.6 cm/sec      LV V1 mean 61.2 cm/sec      LV V1 VTI 17.2 cm      MR max reynaldo 332.0 cm/sec      MR max PG 44.1 mmHg      SV(Ao) 206.9 ml      SI(Ao) 88.2 ml/m^2      SV(LVOT) 78.9 ml      SI(LVOT) 33.7 ml/m^2      PA V2 max 72.3 cm/sec      PA max PG 2.1 mmHg      PA max PG (full) 1.1 mmHg      PA V2 mean 47.4 cm/sec      PA mean PG 1.0 mmHg      PA mean PG (full) 0.47 mmHg      PA V2 VTI 14.8 cm      PA acc time 0.13 sec      RV V1 max PG 0.96 mmHg      RV V1 mean PG 0.54 mmHg      RV V1 max 49.0 cm/sec      RV V1 mean 34.4 cm/sec      RV V1 VTI 10.0 cm      TR max reynaldo 273.8 cm/sec      PA pr(Accel) 18.3 mmHg      Pulm Sys Reynaldo 43.5 cm/sec      Pulm Forbes Reynaldo 33.2 cm/sec      Pulm S/D 1.3     Pulm A Revs Dur 0.08 sec      Pulm A Revs Reynaldo 27.9 cm/sec       CV ECHO DILCIA - BZI_BMI 29.2 kilograms/m^2       CV ECHO DILCIA - BSA(HAYCOCK) 2.4 m^2       CV ECHO DILCIA - BZI_METRIC_WEIGHT 106.1 kg       CV ECHO DILCIA - BZI_METRIC_HEIGHT 190.5 cm      EF(MOD-bp) 29.0 %      LA dimension(2D) 3.2 cm      Echo EF Estimated 25 %     Narrative:       · The left ventricular cavity is moderate-to-severely dilated.  · Left ventricular wall thickness is consistent with mild concentric   hypertrophy.  · The following left ventricular wall segments are hypokinetic: basal   anterolateral, basal inferolateral, mid inferolateral, apical inferior,   mid inferior and mid inferoseptal. The following left ventricular wall   segments are akinetic: basal anterior, mid anterior, apical anterior, mid   anterolateral, apical lateral, basal inferior, apical septal, basal   inferoseptal, apex, basal anteroseptal and mid  anteroseptal.  · Estimated EF = 25%.  · Left ventricular systolic function is severely decreased.  · Left ventricular diastolic dysfunction (grade I) consistent with   impaired relaxation.  · Mild mitral valve regurgitation is present  · Mild tricuspid valve regurgitation is present.  · Left atrial cavity size is mildly dilated.                  Results for orders placed during the hospital encounter of 10/31/19   Adult Transthoracic Echo Complete W/ Cont if Necessary Per Protocol    Narrative · The left ventricular cavity is moderate-to-severely dilated.  · Left ventricular wall thickness is consistent with mild concentric   hypertrophy.  · The following left ventricular wall segments are hypokinetic: basal   anterolateral, basal inferolateral, mid inferolateral, apical inferior,   mid inferior and mid inferoseptal. The following left ventricular wall   segments are akinetic: basal anterior, mid anterior, apical anterior, mid   anterolateral, apical lateral, basal inferior, apical septal, basal   inferoseptal, apex, basal anteroseptal and mid anteroseptal.  · Estimated EF = 25%.  · Left ventricular systolic function is severely decreased.  · Left ventricular diastolic dysfunction (grade I) consistent with   impaired relaxation.  · Mild mitral valve regurgitation is present  · Mild tricuspid valve regurgitation is present.  · Left atrial cavity size is mildly dilated.          Xr Hand 3+ View Right    Result Date: 10/31/2019  No fractures.  Electronically Signed ByLelo Gotti On:10/31/2019 4:47 PM This report was finalized on [id number] by  Yaw Gotti, .    Ct Head Without Contrast    Result Date: 10/31/2019  Old right cerebellar infarct. The CT scan of the head is otherwise unremarkable.  Electronically Signed ByLelo Gotti On:10/31/2019 4:51 PM This report was finalized on [id number] by  Yaw Gotti, .    Xr Chest 1 View    Result Date: 10/31/2019  Clear lungs.  Electronically Signed  By-Yaw Gotti On:10/31/2019 4:45 PM This report was finalized on [id number] by  Yaw Gotti, .      Xrays, labs reviewed personally by physician.    Condition on Discharge:    Stable    Discharge Diet:   Dietary Orders (From admission, onward)    Start     Ordered    10/31/19 1919  Diet Cardiac; Healthy Heart  Diet Effective Now     Question Answer Comment   Diet / Texture / Consistency Cardiac    Select Type: Healthy Heart        10/31/19 1923          Activity at Discharge:       Follow-up Appointments  No future appointments.      Test Results Pending at Discharge       Risk for Readmission (LACE) Score: 3 (11/1/2019  6:00 AM)          Paul Cui MD  11/01/19  11:17 AM    Time: Greater than 30 minutes in discharge activity for .

## 2019-11-15 ENCOUNTER — APPOINTMENT (OUTPATIENT)
Dept: GENERAL RADIOLOGY | Facility: HOSPITAL | Age: 49
End: 2019-11-15

## 2019-11-15 ENCOUNTER — HOSPITAL ENCOUNTER (OUTPATIENT)
Facility: HOSPITAL | Age: 49
Setting detail: OBSERVATION
Discharge: HOME | End: 2019-11-16
Attending: INTERNAL MEDICINE | Admitting: INTERNAL MEDICINE

## 2019-11-15 DIAGNOSIS — Z45.02 DEFIBRILLATOR DISCHARGE: Primary | ICD-10-CM

## 2019-11-15 DIAGNOSIS — R07.9 CHEST PAIN, UNSPECIFIED TYPE: ICD-10-CM

## 2019-11-15 DIAGNOSIS — Z72.0 TOBACCO ABUSE: ICD-10-CM

## 2019-11-15 PROBLEM — R07.89 CHEST PAIN, ATYPICAL: Status: ACTIVE | Noted: 2019-11-15

## 2019-11-15 PROBLEM — I63.9 STROKE (HCC): Chronic | Status: ACTIVE | Noted: 2019-11-15

## 2019-11-15 LAB
ALBUMIN SERPL-MCNC: 4.1 G/DL (ref 3.5–5.2)
ALBUMIN/GLOB SERPL: 1.4 G/DL
ALP SERPL-CCNC: 74 U/L (ref 39–117)
ALT SERPL W P-5'-P-CCNC: 23 U/L (ref 1–41)
ANION GAP SERPL CALCULATED.3IONS-SCNC: 12 MMOL/L (ref 5–15)
AST SERPL-CCNC: 20 U/L (ref 1–40)
BASOPHILS # BLD AUTO: 0.2 10*3/MM3 (ref 0–0.2)
BASOPHILS NFR BLD AUTO: 1.3 % (ref 0–1.5)
BILIRUB SERPL-MCNC: 0.3 MG/DL (ref 0.2–1.2)
BUN BLD-MCNC: 3 MG/DL (ref 6–20)
BUN/CREAT SERPL: 3.6 (ref 7–25)
CALCIUM SPEC-SCNC: 9.3 MG/DL (ref 8.6–10.5)
CHLORIDE SERPL-SCNC: 101 MMOL/L (ref 98–107)
CO2 SERPL-SCNC: 27 MMOL/L (ref 22–29)
CREAT BLD-MCNC: 0.84 MG/DL (ref 0.76–1.27)
DEPRECATED RDW RBC AUTO: 46.4 FL (ref 37–54)
EOSINOPHIL # BLD AUTO: 0.2 10*3/MM3 (ref 0–0.4)
EOSINOPHIL NFR BLD AUTO: 1.7 % (ref 0.3–6.2)
ERYTHROCYTE [DISTWIDTH] IN BLOOD BY AUTOMATED COUNT: 13.8 % (ref 12.3–15.4)
GFR SERPL CREATININE-BSD FRML MDRD: 97 ML/MIN/1.73
GLOBULIN UR ELPH-MCNC: 2.9 GM/DL
GLUCOSE BLD-MCNC: 160 MG/DL (ref 65–99)
HCT VFR BLD AUTO: 48.7 % (ref 37.5–51)
HGB BLD-MCNC: 17.2 G/DL (ref 13–17.7)
HOLD SPECIMEN: NORMAL
HOLD SPECIMEN: NORMAL
LYMPHOCYTES # BLD AUTO: 3.4 10*3/MM3 (ref 0.7–3.1)
LYMPHOCYTES NFR BLD AUTO: 29.8 % (ref 19.6–45.3)
MCH RBC QN AUTO: 33.8 PG (ref 26.6–33)
MCHC RBC AUTO-ENTMCNC: 35.4 G/DL (ref 31.5–35.7)
MCV RBC AUTO: 95.5 FL (ref 79–97)
MONOCYTES # BLD AUTO: 1.1 10*3/MM3 (ref 0.1–0.9)
MONOCYTES NFR BLD AUTO: 9.8 % (ref 5–12)
NEUTROPHILS # BLD AUTO: 6.6 10*3/MM3 (ref 1.7–7)
NEUTROPHILS NFR BLD AUTO: 57.4 % (ref 42.7–76)
NRBC BLD AUTO-RTO: 0.1 /100 WBC (ref 0–0.2)
PLATELET # BLD AUTO: 231 10*3/MM3 (ref 140–450)
PMV BLD AUTO: 8.9 FL (ref 6–12)
POTASSIUM BLD-SCNC: 4.3 MMOL/L (ref 3.5–5.2)
PROT SERPL-MCNC: 7 G/DL (ref 6–8.5)
RBC # BLD AUTO: 5.11 10*6/MM3 (ref 4.14–5.8)
SODIUM BLD-SCNC: 140 MMOL/L (ref 136–145)
TROPONIN T SERPL-MCNC: <0.01 NG/ML (ref 0–0.03)
TROPONIN T SERPL-MCNC: <0.01 NG/ML (ref 0–0.03)
WBC NRBC COR # BLD: 11.5 10*3/MM3 (ref 3.4–10.8)
WHOLE BLOOD HOLD SPECIMEN: NORMAL
WHOLE BLOOD HOLD SPECIMEN: NORMAL

## 2019-11-15 PROCEDURE — G0378 HOSPITAL OBSERVATION PER HR: HCPCS

## 2019-11-15 PROCEDURE — 93005 ELECTROCARDIOGRAM TRACING: CPT | Performed by: NURSE PRACTITIONER

## 2019-11-15 PROCEDURE — 84484 ASSAY OF TROPONIN QUANT: CPT | Performed by: NURSE PRACTITIONER

## 2019-11-15 PROCEDURE — 80053 COMPREHEN METABOLIC PANEL: CPT | Performed by: NURSE PRACTITIONER

## 2019-11-15 PROCEDURE — 99220 PR INITIAL OBSERVATION CARE/DAY 70 MINUTES: CPT | Performed by: INTERNAL MEDICINE

## 2019-11-15 PROCEDURE — 71045 X-RAY EXAM CHEST 1 VIEW: CPT

## 2019-11-15 PROCEDURE — 93005 ELECTROCARDIOGRAM TRACING: CPT

## 2019-11-15 PROCEDURE — 99284 EMERGENCY DEPT VISIT MOD MDM: CPT

## 2019-11-15 PROCEDURE — 85025 COMPLETE CBC W/AUTO DIFF WBC: CPT | Performed by: NURSE PRACTITIONER

## 2019-11-15 PROCEDURE — 93005 ELECTROCARDIOGRAM TRACING: CPT | Performed by: INTERNAL MEDICINE

## 2019-11-15 RX ORDER — METOPROLOL SUCCINATE 50 MG/1
50 TABLET, EXTENDED RELEASE ORAL DAILY
Status: DISCONTINUED | OUTPATIENT
Start: 2019-11-16 | End: 2019-11-16 | Stop reason: HOSPADM

## 2019-11-15 RX ORDER — ACETAMINOPHEN 160 MG/5ML
650 SOLUTION ORAL EVERY 4 HOURS PRN
Status: DISCONTINUED | OUTPATIENT
Start: 2019-11-15 | End: 2019-11-16 | Stop reason: HOSPADM

## 2019-11-15 RX ORDER — MAGNESIUM SULFATE HEPTAHYDRATE 40 MG/ML
2 INJECTION, SOLUTION INTRAVENOUS AS NEEDED
Status: DISCONTINUED | OUTPATIENT
Start: 2019-11-15 | End: 2019-11-16 | Stop reason: HOSPADM

## 2019-11-15 RX ORDER — BISACODYL 10 MG
10 SUPPOSITORY, RECTAL RECTAL DAILY PRN
Status: DISCONTINUED | OUTPATIENT
Start: 2019-11-15 | End: 2019-11-16 | Stop reason: HOSPADM

## 2019-11-15 RX ORDER — SODIUM CHLORIDE 0.9 % (FLUSH) 0.9 %
10 SYRINGE (ML) INJECTION AS NEEDED
Status: DISCONTINUED | OUTPATIENT
Start: 2019-11-15 | End: 2019-11-16 | Stop reason: HOSPADM

## 2019-11-15 RX ORDER — ACETAMINOPHEN 325 MG/1
650 TABLET ORAL EVERY 4 HOURS PRN
Status: DISCONTINUED | OUTPATIENT
Start: 2019-11-15 | End: 2019-11-16 | Stop reason: HOSPADM

## 2019-11-15 RX ORDER — ASPIRIN 81 MG/1
81 TABLET, CHEWABLE ORAL DAILY
Status: DISCONTINUED | OUTPATIENT
Start: 2019-11-16 | End: 2019-11-16 | Stop reason: HOSPADM

## 2019-11-15 RX ORDER — CHOLECALCIFEROL (VITAMIN D3) 125 MCG
5 CAPSULE ORAL NIGHTLY PRN
Status: DISCONTINUED | OUTPATIENT
Start: 2019-11-15 | End: 2019-11-16 | Stop reason: HOSPADM

## 2019-11-15 RX ORDER — ACETAMINOPHEN 650 MG/1
650 SUPPOSITORY RECTAL EVERY 4 HOURS PRN
Status: DISCONTINUED | OUTPATIENT
Start: 2019-11-15 | End: 2019-11-16 | Stop reason: HOSPADM

## 2019-11-15 RX ORDER — KETOROLAC TROMETHAMINE 15 MG/ML
15 INJECTION, SOLUTION INTRAMUSCULAR; INTRAVENOUS EVERY 6 HOURS PRN
Status: DISCONTINUED | OUTPATIENT
Start: 2019-11-15 | End: 2019-11-16 | Stop reason: HOSPADM

## 2019-11-15 RX ORDER — ONDANSETRON 4 MG/1
4 TABLET, FILM COATED ORAL EVERY 6 HOURS PRN
Status: DISCONTINUED | OUTPATIENT
Start: 2019-11-15 | End: 2019-11-16 | Stop reason: HOSPADM

## 2019-11-15 RX ORDER — NICOTINE 21 MG/24HR
1 PATCH, TRANSDERMAL 24 HOURS TRANSDERMAL EVERY 24 HOURS
Status: DISCONTINUED | OUTPATIENT
Start: 2019-11-15 | End: 2019-11-16 | Stop reason: HOSPADM

## 2019-11-15 RX ORDER — MAGNESIUM SULFATE 1 G/100ML
1 INJECTION INTRAVENOUS AS NEEDED
Status: DISCONTINUED | OUTPATIENT
Start: 2019-11-15 | End: 2019-11-16 | Stop reason: HOSPADM

## 2019-11-15 RX ORDER — BISACODYL 5 MG/1
5 TABLET, DELAYED RELEASE ORAL DAILY PRN
Status: DISCONTINUED | OUTPATIENT
Start: 2019-11-15 | End: 2019-11-16 | Stop reason: HOSPADM

## 2019-11-15 RX ORDER — ALUMINA, MAGNESIA, AND SIMETHICONE 2400; 2400; 240 MG/30ML; MG/30ML; MG/30ML
15 SUSPENSION ORAL EVERY 6 HOURS PRN
Status: DISCONTINUED | OUTPATIENT
Start: 2019-11-15 | End: 2019-11-16 | Stop reason: HOSPADM

## 2019-11-15 RX ORDER — ASPIRIN 81 MG/1
324 TABLET, CHEWABLE ORAL ONCE
Status: COMPLETED | OUTPATIENT
Start: 2019-11-15 | End: 2019-11-15

## 2019-11-15 RX ORDER — ONDANSETRON 2 MG/ML
4 INJECTION INTRAMUSCULAR; INTRAVENOUS EVERY 6 HOURS PRN
Status: DISCONTINUED | OUTPATIENT
Start: 2019-11-15 | End: 2019-11-16 | Stop reason: HOSPADM

## 2019-11-15 RX ORDER — SODIUM CHLORIDE 0.9 % (FLUSH) 0.9 %
10 SYRINGE (ML) INJECTION EVERY 12 HOURS SCHEDULED
Status: DISCONTINUED | OUTPATIENT
Start: 2019-11-15 | End: 2019-11-16 | Stop reason: HOSPADM

## 2019-11-15 RX ADMIN — MELATONIN TAB 5 MG 5 MG: 5 TAB at 21:45

## 2019-11-15 RX ADMIN — ASPIRIN 81 MG 324 MG: 81 TABLET ORAL at 14:45

## 2019-11-15 RX ADMIN — NITROGLYCERIN 1 INCH: 20 OINTMENT TOPICAL at 16:44

## 2019-11-15 RX ADMIN — NICOTINE 1 PATCH: 21 PATCH TRANSDERMAL at 21:39

## 2019-11-15 RX ADMIN — Medication 10 ML: at 21:39

## 2019-11-15 NOTE — ED PROVIDER NOTES
Subjective   49-year-old male presents with a complaint of his defibrillator firing today at 1 PM.  [facility] and does not know his [facility] name.  He also complains of constant left-sided chest pain described as dull that radiates to his chin that started last night, then went away and returned today.  He denies any nausea vomiting diaphoresis.  He reports that his defibrillator is a [employer] brand he does not have his card.  He reports he is a smoker.    1. Location: Left chest  2. Quality: Dull  3. Severity: Mild  4. Worsening factors: Defibrillator firing  5. Alleviating factors: Denies  6. Onset: Last night, worse today  7. Radiation: chin  8. Frequency: Constant periods of intensity  9. Co-morbidities: Atrial septal defect, nonischemic cardiomyopathy, stroke  10. Source: Patient              Review of Systems   Constitutional: Negative for activity change and fatigue.   Respiratory: Negative for cough, chest tightness, shortness of breath and wheezing.    Cardiovascular: Positive for chest pain. Negative for palpitations and leg swelling.   Gastrointestinal: Negative for abdominal pain, nausea and vomiting.   Skin: Negative for color change, pallor and rash.   Neurological: Negative for dizziness, syncope, weakness and numbness.   Hematological: Does not bruise/bleed easily.   All other systems reviewed and are negative.      Past Medical History:   Diagnosis Date   • Atrial septal defect    • Nonischemic cardiomyopathy (CMS/HCC)    • Stroke (CMS/HCC)        Allergies   Allergen Reactions   • Penicillins Anaphylaxis       No past surgical history on file.    Family History   Problem Relation Age of Onset   • Heart disease Mother    • Heart disease Father        Social History     Socioeconomic History   • Marital status:      Spouse name: Not on file   • Number of children: Not on file   • Years of education: Not on file   • Highest education level: Not on file    Tobacco Use   • Smoking status: Current Every Day Smoker     Packs/day: 1.50   Substance and Sexual Activity   • Alcohol use: No     Frequency: Never   • Sexual activity: Defer   Social History Narrative    Cardiologist at [facility]           Objective   Physical Exam   Constitutional: He is oriented to person, place, and time. He appears well-developed and well-nourished. No distress.   HENT:   Head: Normocephalic and atraumatic.   Eyes: Conjunctivae and EOM are normal. Pupils are equal, round, and reactive to light.   Neck: Normal range of motion. Neck supple. No JVD present. No tracheal deviation present. No thyromegaly present.   Cardiovascular: Normal rate, regular rhythm, S1 normal, S2 normal, normal heart sounds, intact distal pulses and normal pulses. Exam reveals no gallop and no friction rub.   No murmur heard.  Pulses:       Radial pulses are 2+ on the right side, and 2+ on the left side.        Popliteal pulses are 2+ on the right side, and 2+ on the left side.        Dorsalis pedis pulses are 2+ on the right side, and 2+ on the left side.        Posterior tibial pulses are 2+ on the right side, and 2+ on the left side.   Pulmonary/Chest: Effort normal and breath sounds normal. No respiratory distress. He has no wheezes. He has no rales. He exhibits no tenderness.   Abdominal: Soft. Bowel sounds are normal. He exhibits no distension and no mass. There is no tenderness. There is no rebound and no guarding. No hernia.   Musculoskeletal:        Right lower leg: Normal. He exhibits no tenderness and no edema.        Left lower leg: Normal. He exhibits no tenderness and no edema.   Neurological: He is alert and oriented to person, place, and time.   Skin: Skin is warm and dry. Capillary refill takes less than 2 seconds.   Psychiatric: He has a normal mood and affect. His behavior is normal. Judgment and thought content normal.   Nursing note and vitals reviewed.      Procedures           ED Course  ED Course as of  Nov 15 1653   Fri Nov 15, 2019   1419 Sinus rhythm with a left bundle branch block.  Rate of 73.  No ectopy.  Similar to previous EKG from 11/8/2013.  Interpreted by Dr. [place] and reviewed by me. ECG 12 Lead [AL]      ED Course User Index  [AL] Sarah Field, NP      Xr Chest 1 View    Result Date: 11/15/2019  Stable borderline cardiac enlargement. No acute chest finding. No significant change from 10/31/2019.  Electronically Signed By-Dr. Yoon Emanuel MD On:11/15/2019 3:17 PM This report was finalized on [id number] by Dr. Yoon Emanuel MD.    Medications   sodium chloride 0.9 % flush 10 mL (not administered)   aspirin chewable tablet 324 mg (324 mg Oral Given 11/15/19 1445)   nitroglycerin (NITROSTAT) ointment 1 inch (1 inch Topical Given 11/15/19 1644)     Labs Reviewed   COMPREHENSIVE METABOLIC PANEL - Abnormal; Notable for the following components:       Result Value    Glucose 160 (*)     BUN 3 (*)     BUN/Creatinine Ratio 3.6 (*)     All other components within normal limits    Narrative:     GFR Normal >60  Chronic Kidney Disease <60  Kidney Failure <15   CBC WITH AUTO DIFFERENTIAL - Abnormal; Notable for the following components:    WBC 11.50 (*)     MCH 33.8 (*)     Lymphocytes, Absolute 3.40 (*)     Monocytes, Absolute 1.10 (*)     All other components within normal limits   TROPONIN (IN-HOUSE) - Normal    Narrative:     Troponin T Reference Range:  <= 0.03 ng/mL-   Negative for AMI  >0.03 ng/mL-     Abnormal for myocardial necrosis.  Clinicians would have to utilize clinical acumen, EKG, Troponin and serial changes to determine if it is an Acute Myocardial Infarction or myocardial injury due to an underlying chronic condition.    RAINBOW DRAW    Narrative:     The following orders were created for panel order [place] Draw.  Procedure                               Abnormality         Status                     ---------                               -----------         ------                      Light Blue Top[[id number]]                                   Final result               Green Top (Gel)[[id number]]                                  Final result               Lavender Top[[id number]]                                     Final result               Gold Top - SST[[id number]]                                   Final result                 Please view results for these tests on the individual orders.   CBC AND DIFFERENTIAL    Narrative:     The following orders were created for panel order CBC & Differential.  Procedure                               Abnormality         Status                     ---------                               -----------         ------                     CBC Auto Differential[[id number]]        Abnormal            Final result                 Please view results for these tests on the individual orders.   LIGHT BLUE TOP   GREEN TOP   LAVENDER TOP   GOLD TOP - SST                 MDM  Number of Diagnoses or Management Options  Chest pain, unspecified type:   Defibrillator discharge:   Tobacco abuse:   Diagnosis management comments: Chart Review:  Comorbidity:  Imaging: Was interpreted by physician and reviewed by myself:  Disposition/Treatment: Discussed results with patient, verbalized understanding.  Agreeable with plan of care.    Patient undressed and placed in gown for exam. 49-year-old male presents with a complaint of his defibrillator firing today at 1 PM.  [facility] and does not know his [facility]ologist name.  He also complains of constant left-sided chest pain described as dull that radiates to his chin that started last night, then went away and returned today.  He denies any nausea vomiting diaphoresis.  He reports that his defibrillator is a [employer] brand he does not have his card.  He reports he is a smoker.  IV established and labs obtained.  Pain protocol initiated.  Charge nurse notified and defibrillator has been interrogated.  [facility] the [facility], not received.  Interrogation report received that reports that there were 0 shocks delivered.  Spoke with patient about admission which she is agreeable to.  He did request that he have trazodone ordered for sleep.  This provider will pass this along the report.  Hospitalist paged for admission.  Spoke with KAYLI [place] who accepted admission on behalf of Dr. Cui.         Amount and/or Complexity of Data Reviewed  Clinical lab tests: reviewed  Tests in the radiology section of CPT®: reviewed  Tests in the medicine section of CPT®: reviewed        Final diagnoses:   Defibrillator discharge   Chest pain, unspecified type   Tobacco abuse              Sarah Field, NP  11/15/19 9892

## 2019-11-15 NOTE — ED NOTES
Pt states he has had sharp pain in his chest since 1:00 today. Pt states his defibrillator fired.       Yoanna White, LPN  11/15/19 6122

## 2019-11-15 NOTE — PLAN OF CARE
Problem: Patient Care Overview  Goal: Plan of Care Review  Outcome: Ongoing (interventions implemented as appropriate)   11/15/19 7498   Coping/Psychosocial   Plan of Care Reviewed With patient   Plan of Care Review   Progress no change   OTHER   Outcome Summary new admit     Goal: Individualization and Mutuality  Outcome: Ongoing (interventions implemented as appropriate)    Goal: Discharge Needs Assessment  Outcome: Ongoing (interventions implemented as appropriate)    Goal: Interprofessional Rounds/Family Conf  Outcome: Ongoing (interventions implemented as appropriate)      Problem: Cardiac: ACS (Acute Coronary Syndrome) (Adult)  Goal: Signs and Symptoms of Listed Potential Problems Will be Absent, Minimized or Managed (Cardiac: ACS)  Outcome: Ongoing (interventions implemented as appropriate)

## 2019-11-15 NOTE — H&P
[facility] Medicine Services        Patient Name:  Frankie Bourgeois  YOB: 1970  MRN:  [id number]  Admit Date:  11/15/2019    Patient Care Team:  Provider, No Known as PCP - General        History Present Illness     Chief Complaint   Patient presents with   • Chest Pain       Mr. Bourgeois is a 49 y.o. male with reported PMH of cardiomyopathy s/p AICD who presented to [facility] ED 11/15/2019 with complaints of his AICD firing at 1pm. He stated he had palpitations and feeling like his heart had been racing throughout the day. His palpitations seemed to worsen with activity. He was lying down and felt his AICD shock. Since that time he has had left sided chest pain with radiation into his neck. He has chronic dyspnea and does not feel his dyspnea has worsened. He denied any history of artery disease or stents.  He said [facility] some time.     In the ED the patient was found to have unchanged EKG. First troponin was normal.  He was given Nitropaste and admitted for further work-up.  It appears the patient was here back in October with same complaints and left AMA. Interrogation of pacemaker reviewed and showed no tachyarrhythmias or ventricular tachycardia and no shock       Review of Systems   Constitution: Negative.   HENT: Negative.    Eyes: Negative.    Cardiovascular: Positive for chest pain and palpitations. Negative for leg swelling.   Respiratory: Positive for shortness of breath.    Endocrine: Negative.    Hematologic/Lymphatic: Negative.    Skin: Negative.    Musculoskeletal: Negative.    Gastrointestinal: Negative.    Genitourinary: Negative.    Neurological: Negative.    Psychiatric/Behavioral: Negative.    Allergic/Immunologic: Negative.    All other systems reviewed and are negative.          Personal History     Past Medical History:   Diagnosis Date   • Atrial septal defect    • Nonischemic cardiomyopathy (CMS/HCC)    • Stroke (CMS/HCC)       No past surgical history on file.  Family History   Problem Relation Age of Onset   • Heart disease Mother    • Heart disease Father      Social History     Tobacco Use   • Smoking status: Current Every Day Smoker     Packs/day: 1.50   Substance Use Topics   • Alcohol use: No     Frequency: Never   • Drug use: Not on file     Prior to Admission medications    Medication Sig Start Date End Date Taking? Authorizing Provider   metoprolol succinate XL (TOPROL-XL) 50 MG 24 hr tablet Take 50 mg by mouth Daily.   Yes Provider, Historical, MD     Allergies:    Allergies   Allergen Reactions   • Penicillins Anaphylaxis         Objective     Vital Signs  Temp:  [97.5 °F (36.4 °C)-98.3 °F (36.8 °C)] 98.3 °F (36.8 °C)  Heart Rate:  [69-79] 77  Resp:  [20] 20  BP: (124-143)/(80-84) 124/80  SpO2:  [95 %-99 %] 96 %  on   ;   Device (Oxygen Therapy): room air  Body mass index is 29.43 kg/m².    Physical Exam   Constitutional: He is oriented to person, place, and time. He appears well-developed and well-nourished. No distress.   HENT:   Head: Normocephalic and atraumatic.   Nose: Nose normal.   Eyes: Conjunctivae and EOM are normal. Pupils are equal, round, and reactive to light.   Neck: Normal range of motion.   Cardiovascular: Normal rate, regular rhythm, normal heart sounds and intact distal pulses.   Pulmonary/Chest: Effort normal and breath sounds normal. No stridor. No respiratory distress.   Abdominal: Soft. Bowel sounds are normal. He exhibits no distension. There is no tenderness.   Musculoskeletal: Normal range of motion. He exhibits no edema, tenderness or deformity.   Neurological: He is alert and oriented to person, place, and time. No cranial nerve deficit.   Skin: Skin is warm and dry. No rash noted. He is not diaphoretic. No erythema.   Psychiatric: He has a normal mood and affect. His behavior is normal.   Nursing note and vitals reviewed.      Results Review:  I reviewed the patient's new clinical results.  I  reviewed the patient's new imaging results and agree with the interpretation.  I reviewed the patient's other test results and agree with the interpretation  I personally viewed and interpreted the patient's EKG/Telemetry data  Discussed with ED provider.    Results from last 7 days   Lab Units 11/15/19  1445   WBC 10*3/mm3 11.50*   HEMOGLOBIN g/dL 17.2   HEMATOCRIT % 48.7   PLATELETS 10*3/mm3 231     Results from last 7 days   Lab Units 11/15/19  1445   SODIUM mmol/L 140   POTASSIUM mmol/L 4.3   CHLORIDE mmol/L 101   CO2 mmol/L 27.0   BUN mg/dL 3*   CREATININE mg/dL 0.84   GLUCOSE mg/dL 160*   CALCIUM mg/dL 9.3     Results from last 7 days   Lab Units 11/15/19  1445   SODIUM mmol/L 140   POTASSIUM mmol/L 4.3   CHLORIDE mmol/L 101   CO2 mmol/L 27.0   BUN mg/dL 3*   CREATININE mg/dL 0.84   CALCIUM mg/dL 9.3   BILIRUBIN mg/dL 0.3   ALK PHOS U/L 74   ALT (SGPT) U/L 23   AST (SGOT) U/L 20   GLUCOSE mg/dL 160*         Lab Results   Component Value Date    CALCIUM 9.3 11/15/2019     No results found for: HGBA1C  Lab Results   Component Value Date    CHOL 142 11/01/2019    TRIG 364 (H) 11/01/2019    HDL 29 (L) 11/01/2019    LDL 40 11/01/2019     No results found for: LIPASE          ECG/EMG Results (most recent)     Procedure Component Value Units Date/Time    ECG 12 Lead [[id number]] Collected:  11/15/19 1419     Updated:  11/15/19 1421    Narrative:       HEART RATE= 73  bpm  RR Interval= 828  ms  MI Interval= 220  ms  P Horizontal Axis= -20  deg  P Front Axis= 11  deg  QRSD Interval= 136  ms  QT Interval= 417  ms  QRS Axis= -78  deg  T Wave Axis= 44  deg  - ABNORMAL ECG -  Sinus rhythm  Prolonged MI interval  Probable left atrial enlargement  Left bundle branch block  ST elevation secondary to IVCD  Electronically Signed By:   Date and Time of Study: 2019-11-15 14:19:44               Results for orders placed during the hospital encounter of 10/31/19   Adult Transthoracic Echo Complete W/ Cont if Necessary Per Protocol     Narrative · The left ventricular cavity is moderate-to-severely dilated.  · Left ventricular wall thickness is consistent with mild concentric   hypertrophy.  · The following left ventricular wall segments are hypokinetic: basal   anterolateral, basal inferolateral, mid inferolateral, apical inferior,   mid inferior and mid inferoseptal. The following left ventricular wall   segments are akinetic: basal anterior, mid anterior, apical anterior, mid   anterolateral, apical lateral, basal inferior, apical septal, basal   inferoseptal, apex, basal anteroseptal and mid anteroseptal.  · Estimated EF = 25%.  · Left ventricular systolic function is severely decreased.  · Left ventricular diastolic dysfunction (grade I) consistent with   impaired relaxation.  · Mild mitral valve regurgitation is present  · Mild tricuspid valve regurgitation is present.  · Left atrial cavity size is mildly dilated.          Xr Chest 1 View    Result Date: 11/15/2019  Stable borderline cardiac enlargement. No acute chest finding. No significant change from 10/31/2019.  Electronically Signed By-Dr. Yoon Emanuel MD On:11/15/2019 3:17 PM This report was finalized on [id number] by Dr. Yoon Emanuel MD.        Estimated Creatinine Clearance: 140.7 mL/min (by C-G formula based on SCr of 0.84 mg/dL).      Assessment/Plan     Active Hospital Problems    Diagnosis POA   • Defibrillator discharge [Z45.02] Not Applicable     Priority: High   • Chest pain, atypical [R07.89] Yes     Priority: High   • Stroke (CMS/HCC) [I63.9] Yes   • AICD discharge [Z45.02] Not Applicable   • NICM (nonischemic cardiomyopathy) (CMS/HCC) [I42.8] Yes   • Atrial septal defect [Q21.1] Not Applicable       Reported Defibrillator discharge, Palpitations, Chest pain  -EKG showed rhythm, prolonged NC interval, probable left atrial enlargement, ST elevation secondary to IVCD, no significant change  -First troponin negative  -Defibrillator interrogation showed no  tachyarrhythmias, no VT/VF, and no shock  -Given Nitropaste in ED improvement  -serial troponins r/o ACS  -consider cardiology due to extensive cardiac history    Nonischemic cardiomyopathy  -Continue home beta-blocker    Atrial septal defect    H/o CVA    Chronic tobacco abuse  -nicotine patch prn  -encourage cessation      · I discussed the patients findings and my recommendations with patient.    VTE Prophylaxis - SCDs.      Code Status -  Code Status and Medical Interventions:   Ordered at: 11/15/19 3954     Level Of Support Discussed With:    Patient     Code Status:    CPR     Medical Interventions (Level of Support Prior to Arrest):    Full          KAYLI Scott  [facility] Hospitalist Team  11/15/19  6:43 PM

## 2019-11-16 VITALS
OXYGEN SATURATION: 95 % | TEMPERATURE: 97.7 F | HEART RATE: 78 BPM | DIASTOLIC BLOOD PRESSURE: 66 MMHG | SYSTOLIC BLOOD PRESSURE: 105 MMHG | RESPIRATION RATE: 14 BRPM | HEIGHT: 75 IN | WEIGHT: 235.45 LBS | BODY MASS INDEX: 29.28 KG/M2

## 2019-11-16 LAB
ANION GAP SERPL CALCULATED.3IONS-SCNC: 9 MMOL/L (ref 5–15)
BASOPHILS # BLD AUTO: 0.1 10*3/MM3 (ref 0–0.2)
BASOPHILS NFR BLD AUTO: 0.5 % (ref 0–1.5)
BUN BLD-MCNC: 5 MG/DL (ref 6–20)
BUN/CREAT SERPL: 5.8 (ref 7–25)
CALCIUM SPEC-SCNC: 9.1 MG/DL (ref 8.6–10.5)
CHLORIDE SERPL-SCNC: 102 MMOL/L (ref 98–107)
CO2 SERPL-SCNC: 28 MMOL/L (ref 22–29)
CREAT BLD-MCNC: 0.86 MG/DL (ref 0.76–1.27)
DEPRECATED RDW RBC AUTO: 46.4 FL (ref 37–54)
EOSINOPHIL # BLD AUTO: 0.2 10*3/MM3 (ref 0–0.4)
EOSINOPHIL NFR BLD AUTO: 2.1 % (ref 0.3–6.2)
ERYTHROCYTE [DISTWIDTH] IN BLOOD BY AUTOMATED COUNT: 13.9 % (ref 12.3–15.4)
GFR SERPL CREATININE-BSD FRML MDRD: 95 ML/MIN/1.73
GLUCOSE BLD-MCNC: 161 MG/DL (ref 65–99)
HCT VFR BLD AUTO: 46.5 % (ref 37.5–51)
HGB BLD-MCNC: 16.7 G/DL (ref 13–17.7)
LYMPHOCYTES # BLD AUTO: 3.3 10*3/MM3 (ref 0.7–3.1)
LYMPHOCYTES NFR BLD AUTO: 30 % (ref 19.6–45.3)
MAGNESIUM SERPL-MCNC: 1.8 MG/DL (ref 1.6–2.6)
MCH RBC QN AUTO: 34.4 PG (ref 26.6–33)
MCHC RBC AUTO-ENTMCNC: 36 G/DL (ref 31.5–35.7)
MCV RBC AUTO: 95.6 FL (ref 79–97)
MONOCYTES # BLD AUTO: 1 10*3/MM3 (ref 0.1–0.9)
MONOCYTES NFR BLD AUTO: 9.6 % (ref 5–12)
NEUTROPHILS # BLD AUTO: 6.3 10*3/MM3 (ref 1.7–7)
NEUTROPHILS NFR BLD AUTO: 57.8 % (ref 42.7–76)
NRBC BLD AUTO-RTO: 0.1 /100 WBC (ref 0–0.2)
PLATELET # BLD AUTO: 205 10*3/MM3 (ref 140–450)
PMV BLD AUTO: 8.7 FL (ref 6–12)
POTASSIUM BLD-SCNC: 4.1 MMOL/L (ref 3.5–5.2)
POTASSIUM BLD-SCNC: 4.2 MMOL/L (ref 3.5–5.2)
RBC # BLD AUTO: 4.86 10*6/MM3 (ref 4.14–5.8)
SODIUM BLD-SCNC: 139 MMOL/L (ref 136–145)
TROPONIN T SERPL-MCNC: <0.01 NG/ML (ref 0–0.03)
TROPONIN T SERPL-MCNC: <0.01 NG/ML (ref 0–0.03)
WBC NRBC COR # BLD: 10.9 10*3/MM3 (ref 3.4–10.8)

## 2019-11-16 PROCEDURE — G0378 HOSPITAL OBSERVATION PER HR: HCPCS

## 2019-11-16 PROCEDURE — 84132 ASSAY OF SERUM POTASSIUM: CPT | Performed by: NURSE PRACTITIONER

## 2019-11-16 PROCEDURE — 83735 ASSAY OF MAGNESIUM: CPT | Performed by: NURSE PRACTITIONER

## 2019-11-16 PROCEDURE — 80048 BASIC METABOLIC PNL TOTAL CA: CPT | Performed by: NURSE PRACTITIONER

## 2019-11-16 PROCEDURE — 84484 ASSAY OF TROPONIN QUANT: CPT | Performed by: NURSE PRACTITIONER

## 2019-11-16 PROCEDURE — 85025 COMPLETE CBC W/AUTO DIFF WBC: CPT | Performed by: NURSE PRACTITIONER

## 2019-11-16 PROCEDURE — 99217 PR OBSERVATION CARE DISCHARGE MANAGEMENT: CPT | Performed by: HOSPITALIST

## 2019-11-16 RX ADMIN — METOPROLOL SUCCINATE 50 MG: 50 TABLET, EXTENDED RELEASE ORAL at 09:32

## 2019-11-16 RX ADMIN — Medication 10 ML: at 09:33

## 2019-11-16 RX ADMIN — ASPIRIN 81 MG 81 MG: 81 TABLET ORAL at 09:32

## 2019-11-16 NOTE — PLAN OF CARE
Problem: Patient Care Overview  Goal: Plan of Care Review  Outcome: Ongoing (interventions implemented as appropriate)   11/16/19 0340   Coping/Psychosocial   Plan of Care Reviewed With patient   Plan of Care Review   Progress no change       Problem: Cardiac: ACS (Acute Coronary Syndrome) (Adult)  Goal: Signs and Symptoms of Listed Potential Problems Will be Absent, Minimized or Managed (Cardiac: ACS)  Outcome: Ongoing (interventions implemented as appropriate)   11/16/19 0340   Goal/Outcome Evaluation   Problems Assessed (Acute Coronary Syndrome) all   Problems Present (Acute Coronary Syn) chest pain (angina)

## 2019-11-16 NOTE — NURSING NOTE
"Patient stated he was leaving. Charge nurse educated patient to risk of leaving AMA. Patient stated, \"I have to get out of here before I kill someone starting with that doctor\". Security and house supervisor notified. Dr Mcghee and Dr Manley notified. Dr Manley said to put a 72 hour hold on patient and consult psychiatrist, but patient had already left building.    "

## 2019-11-16 NOTE — NURSING NOTE
Patient left AMA at 1145.  Patient was advised not to leave against doctors. Patient was threatening to staff and made verbal threats that he was going to shoot the doctors.  Security was notified and then doctor was called.